# Patient Record
Sex: MALE | Race: WHITE | Employment: FULL TIME | URBAN - METROPOLITAN AREA
[De-identification: names, ages, dates, MRNs, and addresses within clinical notes are randomized per-mention and may not be internally consistent; named-entity substitution may affect disease eponyms.]

---

## 2019-03-26 ENCOUNTER — ANESTHESIA EVENT (OUTPATIENT)
Dept: ENDOSCOPY | Age: 49
End: 2019-03-26
Payer: COMMERCIAL

## 2019-03-26 RX ORDER — TELMISARTAN 40 MG/1
40 TABLET ORAL DAILY
COMMUNITY
End: 2019-09-23

## 2019-03-27 ENCOUNTER — ANESTHESIA (OUTPATIENT)
Dept: ENDOSCOPY | Age: 49
End: 2019-03-27
Payer: COMMERCIAL

## 2019-03-27 ENCOUNTER — HOSPITAL ENCOUNTER (OUTPATIENT)
Age: 49
Setting detail: OUTPATIENT SURGERY
Discharge: HOME | End: 2019-03-27
Attending: INTERNAL MEDICINE | Admitting: INTERNAL MEDICINE
Payer: COMMERCIAL

## 2019-03-27 VITALS
WEIGHT: 315 LBS | OXYGEN SATURATION: 94 % | DIASTOLIC BLOOD PRESSURE: 49 MMHG | RESPIRATION RATE: 18 BRPM | HEART RATE: 54 BPM | BODY MASS INDEX: 40.43 KG/M2 | SYSTOLIC BLOOD PRESSURE: 107 MMHG | TEMPERATURE: 97.4 F | HEIGHT: 74 IN

## 2019-03-27 PROCEDURE — 76060000031 HC ANESTHESIA FIRST 0.5 HR: Performed by: INTERNAL MEDICINE

## 2019-03-27 PROCEDURE — 74011250636 HC RX REV CODE- 250/636

## 2019-03-27 PROCEDURE — 74011250637 HC RX REV CODE- 250/637: Performed by: NURSE ANESTHETIST, CERTIFIED REGISTERED

## 2019-03-27 PROCEDURE — 77030018846 HC SOL IRR STRL H20 ICUM -A: Performed by: INTERNAL MEDICINE

## 2019-03-27 PROCEDURE — 74011250636 HC RX REV CODE- 250/636: Performed by: NURSE ANESTHETIST, CERTIFIED REGISTERED

## 2019-03-27 PROCEDURE — 76040000019: Performed by: INTERNAL MEDICINE

## 2019-03-27 PROCEDURE — 77030008565 HC TBNG SUC IRR ERBE -B: Performed by: INTERNAL MEDICINE

## 2019-03-27 RX ORDER — SODIUM CHLORIDE 0.9 % (FLUSH) 0.9 %
5-40 SYRINGE (ML) INJECTION EVERY 8 HOURS
Status: DISCONTINUED | OUTPATIENT
Start: 2019-03-27 | End: 2019-03-27 | Stop reason: HOSPADM

## 2019-03-27 RX ORDER — SODIUM CHLORIDE, SODIUM LACTATE, POTASSIUM CHLORIDE, CALCIUM CHLORIDE 600; 310; 30; 20 MG/100ML; MG/100ML; MG/100ML; MG/100ML
100 INJECTION, SOLUTION INTRAVENOUS CONTINUOUS
Status: CANCELLED | OUTPATIENT
Start: 2019-03-27

## 2019-03-27 RX ORDER — INSULIN LISPRO 100 [IU]/ML
INJECTION, SOLUTION INTRAVENOUS; SUBCUTANEOUS ONCE
Status: DISCONTINUED | OUTPATIENT
Start: 2019-03-27 | End: 2019-03-27 | Stop reason: HOSPADM

## 2019-03-27 RX ORDER — SODIUM CHLORIDE 0.9 % (FLUSH) 0.9 %
5-40 SYRINGE (ML) INJECTION AS NEEDED
Status: CANCELLED | OUTPATIENT
Start: 2019-03-27

## 2019-03-27 RX ORDER — LIDOCAINE HYDROCHLORIDE 20 MG/ML
INJECTION, SOLUTION EPIDURAL; INFILTRATION; INTRACAUDAL; PERINEURAL AS NEEDED
Status: DISCONTINUED | OUTPATIENT
Start: 2019-03-27 | End: 2019-03-27 | Stop reason: HOSPADM

## 2019-03-27 RX ORDER — SODIUM CHLORIDE, SODIUM LACTATE, POTASSIUM CHLORIDE, CALCIUM CHLORIDE 600; 310; 30; 20 MG/100ML; MG/100ML; MG/100ML; MG/100ML
75 INJECTION, SOLUTION INTRAVENOUS CONTINUOUS
Status: DISCONTINUED | OUTPATIENT
Start: 2019-03-27 | End: 2019-03-27 | Stop reason: HOSPADM

## 2019-03-27 RX ORDER — SODIUM CHLORIDE 0.9 % (FLUSH) 0.9 %
5-40 SYRINGE (ML) INJECTION EVERY 8 HOURS
Status: CANCELLED | OUTPATIENT
Start: 2019-03-27

## 2019-03-27 RX ORDER — LIDOCAINE HYDROCHLORIDE 10 MG/ML
0.1 INJECTION, SOLUTION EPIDURAL; INFILTRATION; INTRACAUDAL; PERINEURAL AS NEEDED
Status: DISCONTINUED | OUTPATIENT
Start: 2019-03-27 | End: 2019-03-27 | Stop reason: HOSPADM

## 2019-03-27 RX ORDER — FAMOTIDINE 20 MG/1
20 TABLET, FILM COATED ORAL ONCE
Status: COMPLETED | OUTPATIENT
Start: 2019-03-27 | End: 2019-03-27

## 2019-03-27 RX ORDER — PROPOFOL 10 MG/ML
INJECTION, EMULSION INTRAVENOUS AS NEEDED
Status: DISCONTINUED | OUTPATIENT
Start: 2019-03-27 | End: 2019-03-27 | Stop reason: HOSPADM

## 2019-03-27 RX ORDER — SODIUM CHLORIDE 0.9 % (FLUSH) 0.9 %
5-40 SYRINGE (ML) INJECTION AS NEEDED
Status: DISCONTINUED | OUTPATIENT
Start: 2019-03-27 | End: 2019-03-27 | Stop reason: HOSPADM

## 2019-03-27 RX ADMIN — SODIUM CHLORIDE, SODIUM LACTATE, POTASSIUM CHLORIDE, AND CALCIUM CHLORIDE: 600; 310; 30; 20 INJECTION, SOLUTION INTRAVENOUS at 07:53

## 2019-03-27 RX ADMIN — PROPOFOL 50 MG: 10 INJECTION, EMULSION INTRAVENOUS at 08:10

## 2019-03-27 RX ADMIN — PROPOFOL 100 MG: 10 INJECTION, EMULSION INTRAVENOUS at 08:00

## 2019-03-27 RX ADMIN — LIDOCAINE HYDROCHLORIDE 40 MG: 20 INJECTION, SOLUTION EPIDURAL; INFILTRATION; INTRACAUDAL; PERINEURAL at 08:00

## 2019-03-27 RX ADMIN — PROPOFOL 100 MG: 10 INJECTION, EMULSION INTRAVENOUS at 08:03

## 2019-03-27 RX ADMIN — FAMOTIDINE 20 MG: 20 TABLET ORAL at 07:09

## 2019-03-27 RX ADMIN — PROPOFOL 50 MG: 10 INJECTION, EMULSION INTRAVENOUS at 08:08

## 2019-03-27 RX ADMIN — PROPOFOL 50 MG: 10 INJECTION, EMULSION INTRAVENOUS at 08:12

## 2019-03-27 RX ADMIN — SODIUM CHLORIDE, SODIUM LACTATE, POTASSIUM CHLORIDE, AND CALCIUM CHLORIDE 75 ML/HR: 600; 310; 30; 20 INJECTION, SOLUTION INTRAVENOUS at 07:09

## 2019-03-27 RX ADMIN — PROPOFOL 50 MG: 10 INJECTION, EMULSION INTRAVENOUS at 08:06

## 2019-03-27 NOTE — DISCHARGE INSTRUCTIONS
Colonoscopy: What to Expect at [address] [place] [address]  After you have a colonoscopy, you will stay at the clinic for 1 to 2 hours until the medicines wear off. Then you can go home. But you will need to arrange for a ride. Your doctor will tell you when you can eat and do your other usual activities. Your doctor will talk to you about when you will need your next colonoscopy. Your doctor can help you decide how often you need to be checked. This will depend on the results of your test and your risk for colorectal cancer. After the test, you may be bloated or have gas pains. You may need to pass gas. If a biopsy was done or a polyp was removed, you may have streaks of blood in your stool (feces) for a few days. Problems such as heavy rectal bleeding may not occur until several weeks after the test. This isn't common. But it can happen after polyps are removed. This care sheet gives you a general idea about how long it will take for you to recover. But each person recovers at a different pace. Follow the steps below to get better as quickly as possible. How can you care for yourself at home? Activity    · Rest when you feel tired.     · You can do your normal activities when it feels okay to do so. Diet    · Follow your doctor's directions for eating.     · Unless your doctor has told you not to, drink plenty of fluids. This helps to replace the fluids that were lost during the colon prep.     · Do not drink alcohol. Medicines    · Your doctor will tell you if and when you can restart your medicines. He or she will also give you instructions about taking any new medicines.     · If you take blood thinners, such as warfarin (Coumadin), clopidogrel (Plavix), or aspirin, be sure to talk to your doctor. He or she will tell you if and when to start taking those medicines again.  Make sure that you understand exactly what your doctor wants you to do.     · If polyps were removed or a biopsy was done during the test, your doctor may tell you not to take aspirin or other anti-inflammatory medicines for a few days. These include ibuprofen (Advil, Motrin) and naproxen (Aleve). Other instructions    · For your safety, do not drive or operate machinery until the medicine wears off and you can think clearly. Your doctor may tell you not to drive or operate machinery until the day after your test.     · Do not sign legal documents or make major decisions until the medicine wears off and you can think clearly. The anesthesia can make it hard for you to fully understand what you are agreeing to. Follow-up care is a key part of your treatment and safety. Be sure to make and go to all appointments, and call your doctor if you are having problems. It's also a good idea to know your test results and keep a list of the medicines you take. When should you call for help? Call 911 anytime you think you may need emergency care. For example, call if:    · You passed out (lost consciousness).     · You pass maroon or bloody stools.     · You have trouble breathing.    Call your doctor now or seek immediate medical care if:    · You have pain that does not get better after you take pain medicine.     · You are sick to your stomach or cannot drink fluids.     · You have new or worse belly pain.     · You have blood in your stools.     · You have a fever.     · You cannot pass stools or gas.    Watch closely for changes in your health, and be sure to contact your doctor if you have any problems. Where can you learn more? Go to http://alison-abdirizak.info/. Enter E264 in the search box to learn more about \"Colonoscopy: What to Expect at Home. \"  Current as of: March 27, 2018  Content Version: 11.9  © 0211-3857 [employer]. Care instructions adapted under license by [employer] (which disclaims liability or warranty for this information).  If you have questions about a medical condition or this instruction, always ask your healthcare professional. [address] any warranty or liability for your use of this information. High-Fiber Diet: Care Instructions  Your Care Instructions    A high-fiber diet may help you relieve constipation and feel less bloated. Your doctor and dietitian will help you make a high-fiber eating plan based on your personal needs. The plan will include the things you like to eat. It will also make sure that you get 30 grams of fiber a day. Before you make changes to the way you eat, be sure to talk with your doctor or dietitian. Follow-up care is a key part of your treatment and safety. Be sure to make and go to all appointments, and call your doctor if you are having problems. It's also a good idea to know your test results and keep a list of the medicines you take. How can you care for yourself at home? · You can increase how much fiber you get if you eat more of certain foods. These foods include:  ? Whole-grain breads and cereals. ? Fruits, such as pears, apples, and peaches. Eat the skins, peels, and seeds, if you can.  ? Vegetables, such as broccoli, cabbage, spinach, carrots, asparagus, and squash. ? Starchy vegetables. These include potatoes with skins, kidney beans, and lima beans. · Take a fiber supplement every day if your doctor recommends it. Examples are Benefiber, Citrucel, FiberCon, and Metamucil. Ask your doctor how much to take. · Drink plenty of fluids, enough so that your urine is light yellow or clear like water. If you have kidney, heart, or liver disease and have to limit fluids, talk with your doctor before you increase the amount of fluids you drink. · Get some exercise every day. Exercise helps stool move through the colon. It also helps prevent constipation. · Keep a food diary. Try to notice and write down what foods cause gas, pain, or other symptoms. Then you can avoid these foods. Where can you learn more?   Go to http://alison-abdirizak.info/. Enter I050 in the search box to learn more about \"High-Fiber Diet: Care Instructions. \"  Current as of: March 28, 2018  Content Version: 11.9  © 2374-4529 [employer]. Care instructions adapted under license by [employer] (which disclaims liability or warranty for this information). If you have questions about a medical condition or this instruction, always ask your healthcare professional. [address] any warranty or liability for your use of this information. DISCHARGE SUMMARY from Nurse    PATIENT INSTRUCTIONS:    After general anesthesia or intravenous sedation, for 24 hours or while taking prescription Narcotics:  · Limit your activities  · Do not drive and operate hazardous machinery  · Do not make important personal or business decisions  · Do  not drink alcoholic beverages  · If you have not urinated within 8 hours after discharge, please contact your surgeon on call. Report the following to your surgeon:  · Excessive pain, swelling, redness or odor of or around the surgical area  · Temperature over 100.5  · Nausea and vomiting lasting longer than 4 hours or if unable to take medications  · Any signs of decreased circulation or nerve impairment to extremity: change in color, persistent  numbness, tingling, coldness or increase pain  · Any questions    *  Please give a list of your current medications to your Primary Care Provider. *  Please update this list whenever your medications are discontinued, doses are      changed, or new medications (including over-the-counter products) are added. *  Please carry medication information at all times in case of emergency situations.     These are general instructions for a healthy lifestyle:    No smoking/ No tobacco products/ Avoid exposure to second hand smoke  Surgeon General's Warning:  Quitting smoking now greatly reduces serious risk to your health. Obesity, smoking, and sedentary lifestyle greatly increases your risk for illness    A healthy diet, regular physical exercise & weight monitoring are important for maintaining a healthy lifestyle    You may be retaining fluid if you have a history of heart failure or if you experience any of the following symptoms:  Weight gain of 3 pounds or more overnight or 5 pounds in a week, increased swelling in our hands or feet or shortness of breath while lying flat in bed. Please call your doctor as soon as you notice any of these symptoms; do not wait until your next office visit. Recognize signs and symptoms of STROKE:    F-face looks uneven    A-arms unable to move or move unevenly    S-speech slurred or non-existent    T-time-call 911 as soon as signs and symptoms begin-DO NOT go       Back to bed or wait to see if you get better-TIME IS BRAIN. Warning Signs of HEART ATTACK     Call 911 if you have these symptoms:   Chest discomfort. Most heart attacks involve discomfort in the center of the chest that lasts more than a few minutes, or that goes away and comes back. It can feel like uncomfortable pressure, squeezing, fullness, or pain.  Discomfort in other areas of the upper body. Symptoms can include pain or discomfort in one or both arms, the back, neck, jaw, or stomach.  Shortness of breath with or without chest discomfort.  Other signs may include breaking out in a cold sweat, nausea, or lightheadedness. Don't wait more than five minutes to call 911 - MINUTES MATTER! Fast action can save your life. Calling 911 is almost always the fastest way to get lifesaving treatment. Emergency Medical Services staff can begin treatment when they arrive -- up to an hour sooner than if someone gets to the hospital by car. The discharge information has been reviewed with the patient and spouse. The patient and spouse verbalized understanding.   Discharge medications reviewed with the patient and spouse and appropriate educational materials and side effects teaching were provided.   ___________________________________________________________________________________________________________________________________

## 2019-03-27 NOTE — PERIOP NOTES
Patient confirmed by two identifiers with discharge instructions prior too being provided to patient and spouse.  Patient armband removed and shredded

## 2019-03-27 NOTE — ANESTHESIA POSTPROCEDURE EVALUATION
Procedure(s):  COLONOSCOPY. MAC    Anesthesia Post Evaluation      Multimodal analgesia: multimodal analgesia used between 6 hours prior to anesthesia start to PACU discharge  Patient location during evaluation: PACU  Patient participation: complete - patient participated  Level of consciousness: awake  Pain management: adequate  Airway patency: patent  Anesthetic complications: no  Cardiovascular status: acceptable  Respiratory status: acceptable  Hydration status: acceptable  Post anesthesia nausea and vomiting:  controlled      Vitals Value Taken Time   BP 98/41 3/27/2019  8:44 AM   Temp 36.8 °C (98.3 °F) 3/27/2019  8:26 AM   Pulse 58 3/27/2019  8:48 AM   Resp 20 3/27/2019  8:48 AM   SpO2 96 % 3/27/2019  8:48 AM   Vitals shown include unvalidated device data.

## 2019-03-27 NOTE — H&P
[facility]   [email or web address]      Impression:   1.crcs  obesity    Plan:     1. colo      Chief Complaint:   crcs    HPI:  Tressa Simon is a 57 y.o. male who is being seen on consult for the above. No Sxs, no fhx , first time screening.     PMH:   Past Medical History:   Diagnosis Date    Hypertension     ITP secondary to infection        PSH:   Past Surgical History:   Procedure Laterality Date    HX APPENDECTOMY      HX ORTHOPAEDIC Right 2013    repair of achilles heel       Social HX:   Social History     Socioeconomic History    Marital status:      Spouse name: Not on file    Number of children: Not on file    Years of education: Not on file    Highest education level: Not on file   Occupational History    Not on file   Social Needs    Financial resource strain: Not on file    Food insecurity:     Worry: Not on file     Inability: Not on file    Transportation needs:     Medical: Not on file     Non-medical: Not on file   Tobacco Use    Smoking status: Never Smoker    Smokeless tobacco: Never Used   Substance and Sexual Activity    Alcohol use: Yes     Comment: rarely    Drug use: Never    Sexual activity: Not on file   Lifestyle    Physical activity:     Days per week: Not on file     Minutes per session: Not on file    Stress: Not on file   Relationships    Social connections:     Talks on phone: Not on file     Gets together: Not on file     Attends Rastafari service: Not on file     Active member of club or organization: Not on file     Attends meetings of clubs or organizations: Not on file     Relationship status: Not on file    Intimate partner violence:     Fear of current or ex partner: Not on file     Emotionally abused: Not on file     Physically abused: Not on file     Forced sexual activity: Not on file   Other Topics Concern    Not on file   Social History Narrative    Not on file       FHX:   History reviewed. No pertinent family history. Allergy:   Allergies   Allergen Reactions    Percocet [Oxycodone-Acetaminophen] Other (comments)     hallucinations       Home Medications:     Medications Prior to Admission   Medication Sig    eltrombopag (PROMACTA) 50 mg tablet Take 50 mg by mouth Daily (before breakfast).  telmisartan (MICARDIS) 40 mg tablet Take 40 mg by mouth daily. Review of Systems:     Constitutional: No fevers, chills, weight loss, fatigue. Skin: No rashes, pruritis, jaundice, ulcerations, erythema. HENT: No headaches, nosebleeds, sinus pressure, rhinorrhea, sore throat. Eyes: No visual changes, blurred vision, eye pain, photophobia, jaundice. Cardiovascular: No chest pain, heart palpitations. Respiratory: No cough, SOB, wheezing, chest discomfort, orthopnea. Gastrointestinal: neg   Genitourinary: No dysuria, bleeding, discharge, pyuria. Musculoskeletal: No weakness, arthralgias, wasting. Endo: No sweats. Heme: No bruising, easy bleeding. Allergies: As noted. Neurological: Cranial nerves intact. Alert and oriented. Gait not assessed. Psychiatric:  No anxiety, depression, hallucinations. Visit Vitals  /73   Pulse 65   Temp 97.6 °F (36.4 °C)   Resp 20   Ht 6' 2\" (1.88 m)   Wt (!) 167.9 kg (370 lb 3.2 oz)   SpO2 95%   BMI 47.53 kg/m²       Physical Assessment:     constitutional: appearance: well developed, well nourished, normal habitus, no deformities, in no acute distress. skin: inspection: no rashes, ulcers, icterus or other lesions; no clubbing or telangiectasias. palpation: no induration or subcutaneos nodules. eyes: inspection: normal conjunctivae and lids; no jaundice pupils: symmetrical, normoreactive to light, normal accommodation and size. ENMT: mouth: normal oral mucosa,lips and gums; good dentition. oropharynx: normal tongue, hard and soft palate; posterior pharynx without erithema, exudate or lesions.    neck: thyroid: normal size, consistency and position; no masses or tenderness. respiratory: effort: normal chest excursion; no intercostal retraction or accessory muscle use. cardiovascular: abdominal aorta: normal size and position; no bruits. palpation: PMI of normal size and position; normal rhythm; no thrill or murmurs. abdominal: abdomen: normal consistency; no tenderness or masses. hernias: no hernias appreciated. liver: normal size and consistency. spleen: not palpable. rectal: hemoccult/guaiac: not performed. musculoskeletal: digits and nails: no clubbing, cyanosis, petechiae or other inflammatory conditions. gait: normal gait and station head and neck: normal range of motion; no pain, crepitation or contracture. spine/ribs/pelvis: normal range of motion; no pain, deformity or contracture. lymphatic: axilae: not palpable. groin: not palpable. neck: within normal limits. other: not palpable. neurologic: cranial nerves: II-XII normal.   psychiatric: judgement/insight: within normal limits. memory: within normal limits for recent and remote events. mood and affect: no evidence of depression, anxiety or agitation. orientation: oriented to time, space and person. Basic Metabolic Profile   No results for input(s): NA, K, CL, CO2, BUN, GLU, CA, MG, PHOS in the last 72 hours. No lab exists for component: CREAT      CBC w/Diff    No results for input(s): WBC, RBC, HGB, HCT, MCV, MCH, MCHC, RDW, PLT, HGBEXT, HCTEXT, PLTEXT in the last 72 hours. No lab exists for component: MPV No results for input(s): GRANS, LYMPH, EOS, PRO, MYELO, METAS, BLAST in the last 72 hours. No lab exists for component: MONO, BASO     Hepatic Function   No results for input(s): ALB, TP, TBILI, GPT, SGOT, AP, AML, LPSE in the last 72 hours. No lab exists for component: Abeba Grove MD, M.D. [facility] [address]  [email or web address]

## 2020-07-08 NOTE — PROGRESS NOTES
[facility]  [address], [place]   Phone: [phone]  Fax: [phone]        INITIAL CONSULTATION      HISTORY OF PRESENT ILLNESS:  Rosendo Mayo is a 52 y.o. male whom is referred from Ángel Magallanes NP secondary to right-sided low back pain without specific trauma. He rates his pain 2-6/10. He denies radicular symptoms. Additionally, he occasionally endorses right groin pain. He reports he was bent down cleaning chicken coops and felt an onset of pain when he stood up. Pt completed MDP followed by Ibuprofen with some benefit. Pt denies change in bowel or bladder habits. Pt denies fever, weight loss, or skin changes. Patient denies previous spinal surgery, injections, or physical therapy/chiropractic care. Pt is a nonsmoker. PmHx of obesity, HTN, previous MRSA infection, osteomyelitis of ankle and foot. Pt was previously followed by Dr. Ronquillo Speaker for back pain. L spine XR dated 6/22/2020 films independently reviewed. Per report, trace anterolisthesis, potentially isthimic in nature of L5 on S1. Mild degenerative changes as described. The patient is RHD.  reviewed. Body mass index is 48.69 kg/m². PCP: Ángel Magallanes NP    Past Medical History:   Diagnosis Date    Hypertension     Hypogonadism in male     ITP secondary to infection     Low testosterone     MRSA (methicillin resistant Staphylococcus aureus)           Past Surgical History:   Procedure Laterality Date    COLONOSCOPY N/A 3/27/2019    COLONOSCOPY performed by Yoselin Schwarz MD at [address] [place] [address] HX APPENDECTOMY      HX ORTHOPAEDIC Right 2013    repair of achilles heel         Social History     Tobacco Use    Smoking status: Never Smoker    Smokeless tobacco: Never Used   Substance Use Topics    Alcohol use: Yes     Comment: rarely       Work status: The patient is employed. Marital status: .      Current Outpatient Medications   Medication Sig Dispense Refill  ibuprofen (MOTRIN) 800 mg tablet Take  by mouth.  Needle, Disp, 25 G 25 gauge x 1\" ndle Use as directed 10 Each 1    Syringe, Disposable, 1 mL syrg Use as directed 30 Syringe 1    Needle, Disp, 18 G 18 gauge x 1\" ndle Use this needle to draw up 0.5ml Testosterone Cypionate 30 Pen Needle 1    Needle, Disp, 25 G 25 gauge x 5/8\" ndle Use this needle to inject 0.5ml SQ weekly 20 Each 0    testosterone cypionate (DEPOTESTOTERONE CYPIONATE) 200 mg/mL injection Inject 0.5ml SQ weekly 6 mL 1    testosterone (Fortesta) 10 mg/0.5 gram /actuation glpm Apply 3 pumps to the inside of each thigh 3 Bottle 2    irbesartan (AVAPRO) 150 mg tablet TAKE 1 TABLET BY MOUTH EVERY DAY  3    ergocalciferol (ERGOCALCIFEROL) 50,000 unit capsule TAKE ONE CAPSULE BY MOUTH ONCE A WEEK  6    eltrombopag (PROMACTA) 50 mg tablet Take 50 mg by mouth Daily (before breakfast). Allergies   Allergen Reactions    Percocet [Oxycodone-Acetaminophen] Other (comments)     hallucinations            Family History   Family history unknown: Yes         REVIEW OF SYSTEMS  Constitutional symptoms: Negative  Eyes: Negative  Ears, Nose, Throat, and Mouth: Negative  Cardiovascular: Negative  Respiratory: Negative  Genitourinary: Negative  Integumentary (Skin and/or breast): Negative  Musculoskeletal: Positive for right-sided low back pain. Extremities: Negative for edema. Endocrine/Rheumatologic: Negative  Hematologic/Lymphatic: Negative  Allergic/Immunologic: Negative  Psychiatric: Negative       PHYSICAL EXAMINATION  Visit Vitals  BP (!) 153/92 (BP 1 Location: Left arm, BP Patient Position: Sitting)   Pulse 72   Temp 97.1 °F (36.2 °C) (Temporal)   Ht 6' 2\" (1.88 m)   Wt (!) 379 lb 3.2 oz (172 kg)   SpO2 95%   BMI 48.69 kg/m²       CONSTITUTIONAL: NAD, A&O x 3  HEART: Regular rate and rhythm  GASTROINTESTINAL: Positive bowel sounds, soft, nontender, and nondistended  LUNGS: Clear to auscultation bilaterally.   SKIN: Negative for rash.  RANGE OF MOTION: The patient has full passive range of motion in all four extremities. SENSATION: Sensation is intact to light touch throughout. MOTOR:   Straight Leg Raise: Negative, bilateral  Allen: Negative, bilateral  Deep tendon reflexes are 0 at the biceps, triceps, and brachioradialis bilaterally. Deep tendon reflexes are 0 at the knees and ankles bilaterally. Shoulder AB/Flex Elbow Flex Wrist Ext Elbow Ext Wrist Flex Hand Intrin Tone   Right +4/5 +4/5 +4/5 +4/5 +4/5 +4/5 +4/5   Left +4/5 +4/5 +4/5 +4/5 +4/5 +4/5 +4/5              Hip Flex Knee Ext Knee Flex Ankle DF GTE Ankle PF Tone   Right +4/5 +4/5 +4/5 +4/5 +4/5 +4/5 +4/5   Left +4/5 +4/5 +4/5 +4/5 +4/5 +4/5 +4/5       ASSESSMENT   Diagnoses and all orders for this visit:    1. Lumbosacral spondylosis without myelopathy    2. Obesity, morbid ([address]    3. Spondylolisthesis, acquired      IMPRESSIONS/RECOMMENDATIONS:  Patient presents today with c/o right-sided low back pain. Multiple treatment options were discussed. I offered injections, pt declined. I will refer him to physical therapy with an emphasis on HEP. Patient is neurologically intact. I will see the patient back in 6 week's time or earlier if needed. Written by Albert Ramirez, as dictated by Fabienne García MD  I examined the patient, reviewed and agree with the note.

## 2020-07-13 ENCOUNTER — OFFICE VISIT (OUTPATIENT)
Dept: ORTHOPEDIC SURGERY | Age: 50
End: 2020-07-13

## 2020-07-13 VITALS
OXYGEN SATURATION: 95 % | BODY MASS INDEX: 40.43 KG/M2 | WEIGHT: 315 LBS | TEMPERATURE: 97.1 F | SYSTOLIC BLOOD PRESSURE: 153 MMHG | HEIGHT: 74 IN | DIASTOLIC BLOOD PRESSURE: 92 MMHG | HEART RATE: 72 BPM

## 2020-07-13 DIAGNOSIS — M47.817 LUMBOSACRAL SPONDYLOSIS WITHOUT MYELOPATHY: Primary | ICD-10-CM

## 2020-07-13 DIAGNOSIS — M43.10 SPONDYLOLISTHESIS, ACQUIRED: ICD-10-CM

## 2020-07-13 DIAGNOSIS — E66.01 OBESITY, MORBID (HCC): ICD-10-CM

## 2020-07-13 RX ORDER — IBUPROFEN 800 MG/1
TABLET ORAL
COMMUNITY
End: 2021-10-14

## 2020-07-13 NOTE — Clinical Note
7/13/20 Patient: Joseline Sharma YOB: 1970 Date of Visit: 7/13/2020 Provider MD Cristiano 
VIA Dear Provider Nati Dhillon MD, Thank you for referring Mr. Joseline Sharma to [address] for evaluation. My notes for this consultation are attached. If you have questions, please do not hesitate to call me. I look forward to following your patient along with you. Sincerely, Cash Hollis MD

## 2022-02-04 NOTE — ANESTHESIA PREPROCEDURE EVALUATION
Relevant Problems   No relevant active problems       Anesthetic History   No history of anesthetic complications            Review of Systems / Medical History  Patient summary reviewed and pertinent labs reviewed    Pulmonary                   Neuro/Psych   Within defined limits           Cardiovascular    Hypertension                   GI/Hepatic/Renal  Within defined limits              Endo/Other        Morbid obesity and anemia     Other Findings   Comments: Hx thrombocytopenia           Physical Exam    Airway  Mallampati: I  TM Distance: > 6 cm  Neck ROM: normal range of motion   Mouth opening: Normal     Cardiovascular    Rhythm: regular  Rate: normal         Dental  No notable dental hx       Pulmonary  Breath sounds clear to auscultation               Abdominal  GI exam deferred       Other Findings            Anesthetic Plan    ASA: 3  Anesthesia type: MAC            Anesthetic plan and risks discussed with: Patient and Parent / Dane Left
The procedure was performed independently

## 2022-03-18 ENCOUNTER — HOSPITAL ENCOUNTER (OUTPATIENT)
Dept: LAB | Age: 52
Discharge: HOME | End: 2022-03-18

## 2022-03-18 PROCEDURE — 99001 SPECIMEN HANDLING PT-LAB: CPT

## 2022-04-27 ENCOUNTER — HOSPITAL ENCOUNTER (OUTPATIENT)
Dept: LAB | Age: 52
Discharge: HOME | End: 2022-04-27

## 2022-04-27 PROCEDURE — 99001 SPECIMEN HANDLING PT-LAB: CPT

## 2022-06-06 ENCOUNTER — HOSPITAL ENCOUNTER (OUTPATIENT)
Dept: LAB | Age: 52
Discharge: HOME | End: 2022-06-06

## 2022-06-06 PROCEDURE — 99001 SPECIMEN HANDLING PT-LAB: CPT

## 2022-06-27 ENCOUNTER — HOSPITAL ENCOUNTER (OUTPATIENT)
Dept: LAB | Age: 52
Discharge: HOME | End: 2022-06-27

## 2022-06-27 PROCEDURE — 99001 SPECIMEN HANDLING PT-LAB: CPT

## 2022-07-26 ENCOUNTER — HOSPITAL ENCOUNTER (OUTPATIENT)
Dept: LAB | Age: 52
Discharge: HOME | End: 2022-07-26

## 2022-07-26 PROCEDURE — 99001 SPECIMEN HANDLING PT-LAB: CPT

## 2022-08-29 ENCOUNTER — TRANSCRIBE ORDER (OUTPATIENT)
Dept: REGISTRATION | Age: 52
End: 2022-08-29

## 2022-08-29 ENCOUNTER — HOSPITAL ENCOUNTER (OUTPATIENT)
Dept: LAB | Age: 52
Discharge: HOME | End: 2022-08-29

## 2022-08-29 DIAGNOSIS — D69.3 IMMUNE THROMBOCYTOPENIC PURPURA (HCC): Primary | ICD-10-CM

## 2022-08-29 PROCEDURE — 99001 SPECIMEN HANDLING PT-LAB: CPT

## 2022-09-08 ENCOUNTER — HOSPITAL ENCOUNTER (OUTPATIENT)
Dept: PHYSICAL THERAPY | Age: 52
Discharge: HOME | End: 2022-09-08
Payer: COMMERCIAL

## 2022-09-08 PROCEDURE — 97161 PT EVAL LOW COMPLEX 20 MIN: CPT

## 2022-09-08 PROCEDURE — 97535 SELF CARE MNGMENT TRAINING: CPT

## 2022-09-08 PROCEDURE — 97110 THERAPEUTIC EXERCISES: CPT

## 2022-09-08 NOTE — PROGRESS NOTES
[facility]  [address]  [place], [address]  [phone] fax    Plan of Care/ Statement of Necessity for Physical Therapy Services    Patient name: Desiree Koroma Start of Care: 2022   Referral source: Rick Rodriguez, DO : 1970    Medical Diagnosis: Pain in right arm [M79.601]  Payor: Adonay Godoy / Plan: Stephane López / Product Type: Workers Comp /  Onset Date:2022    Treatment Diagnosis: Right elbow pain   Prior Hospitalization: see medical history Provider#: [id number]   Medications: Verified on Patient summary List    Comorbidities: Achilles repair   Prior Level of Function: Pt RHD  unlimited with work duties     [employer] of Care and following information is based on the information from the initial evaluation. 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 He was found to have a distal biceps rupture but opted not for surgery due to extensive tendon transfers reported by patient. He reports modified duty currently with improving tolerance. Mainly reports weakness/fatigue with heavier activity and lateral elbow pain. Pt demonstrates symmetrical shoulder strength and elbow strength with MMT. No change in pain levels with examination today. He does demonstrates signs of lateral epicondylitis. Pt would benefit from PT to improve strength, endurance and pain to return to full work duty.      Evaluation Complexity History MEDIUM  Complexity : 1-2 comorbidities / personal factors will impact the outcome/ POC ; Examination HIGH Complexity : 4+ Standardized tests and measures addressing body structure, function, activity limitation and / or participation in recreation  ;Presentation LOW Complexity : Stable, uncomplicated  ;Clinical Decision Making MEDIUM Complexity : FOTO score of 26-74  Overall Complexity Rating: LOW   Problem List: pain affecting function, decrease strength, decrease ADL/ functional abilitiies, decrease activity tolerance, and decrease flexibility/ joint mobility   Treatment Plan may include any combination of the following: Therapeutic exercise, Therapeutic activities, Neuromuscular re-education, Physical agent/modality, Manual therapy, Patient education, Self Care training, and Functional mobility training  Patient / Family readiness to learn indicated by: asking questions, trying to perform skills, and interest  Persons(s) to be included in education: patient (P)  Barriers to Learning/Limitations: None  Patient Goal (s): Get back to work  Patient Self Reported Health Status: good  Rehabilitation Potential: good    Short Term Goals: To be accomplished in 3 weeks:  Pt will demonstrate I and compliance with HEP to maximize self management of symptoms. Pt will demonstrate right shoulder IR strength 5/5 without pain for improved ease of work duties. Pt will report 50% improvement in right lateral elbow symptoms with ADLs to return to full work duty. Pt will improve FOTO score to 74 to demonstrate improved quality of life and function. Frequency / Duration: Patient to be seen 2 times per week for 3 weeks. Patient/ Caregiver education and instruction: Diagnosis, prognosis, self care, activity modification, and exercises   [x]  Plan of care has been reviewed with PTA Corlis Hatchet DPT CMTPT 9/8/2022 12:35 PM    ________________________________________________________________________    I certify that the above Therapy Services are being furnished while the patient is under my care. I agree with the treatment plan and certify that this therapy is necessary.     [de-identified] Signature:____________Date:_________TIME:________     Shai Burroughs, DO  ** Signature, Date and Time must be completed for valid certification **    Please sign and return to [facility] [address] [place] [address] [address]  [phone] fax

## 2022-09-08 NOTE — PROGRESS NOTES
PT DAILY TREATMENT NOTE     Patient Name: David Mckeon  Date:2022  : 1970  [x]  Patient  Verified  Payor: Luis Londono / Plan: Yumi Martinez / Product Type: Workers Comp /    In [employer] time:12:32  Total Treatment Time (min): 36  Visit #: 1 of 6    Treatment Area: Pain in right arm [M79.601]    SUBJECTIVE  Pain Level (0-10 scale): 0-1  Any medication changes, allergies to medications, adverse drug reactions, diagnosis change, or new procedure performed?: [x] No    [] Yes (see summary sheet for update)  Subjective functional status/changes:   [] No changes reported  \"Its like phantom pain, it comes and goes, but feels tired when I have to use it a lot\"    OBJECTIVE    18 min [x]Eval                  []Re-Eval       8 min Therapeutic Exercise:  [] See flow sheet :   Rationale: increase ROM, increase strength, and increase proprioception to improve the patients ability to perform ADLs    10 min Therapeutic Activity:  []  See flow sheet :   Rationale:  pt education and instruction   to improve the patients ability to self manage symptoms and return to work             With   [] TE   [] TA   [] neuro   [] other: Patient Education: [x] Review HEP    [] Progressed/Changed HEP based on:   [] positioning   [] body mechanics   [] transfers   [] heat/ice application    [] other:      Other Objective/Functional Measures:      Pain Level (0-10 scale) post treatment: 0-1    ASSESSMENT/Changes in Function: see POC    Patient will continue to benefit from skilled PT services to modify and progress therapeutic interventions, address functional mobility deficits, address ROM deficits, address strength deficits, analyze and address soft tissue restrictions, analyze and cue movement patterns, and analyze and modify body mechanics/ergonomics to attain remaining goals.      [x]  See Plan of Care  []  See progress note/recertification  []  See Discharge Summary         Progress towards goals / Updated goals:  Short Term Goals: To be accomplished in 3 weeks:  Pt will demonstrate I and compliance with HEP to maximize self management of symptoms. IE: HEP issued and instructed  Pt will demonstrate right shoulder IR strength 5/5 without pain for improved ease of work duties. IE: 5/5 at 0 deg abd, mild pain  Pt will report 50% improvement in right lateral elbow symptoms with ADLs to return to full work duty. IE: 0%  Pt will improve FOTO score to 74 to demonstrate improved quality of life and function.   IE: 72    PLAN  []  Upgrade activities as tolerated     [x]  Continue plan of care  []  Update interventions per flow sheet       []  Discharge due to:_  []  Other:_      Debra Gasca DPT CMTPT 9/8/2022  2:32 PM    Future Appointments   Date Time Provider Kim Talamantes   9/14/2022  1:30 PM Ishmael Green, PTA [facility]   9/16/2022  1:30 PM Mejia Gonzales, PT [facility]   9/19/2022 12:00 PM Trellis Postin, PT [facility]   9/21/2022 12:00 PM Trellis Postin, PT [facility]   9/27/2022 12:00 PM Trellis Postin, PT [facility]   9/29/2022 12:15 PM Tyree Butt, PTA [facility]

## 2022-09-14 ENCOUNTER — HOSPITAL ENCOUNTER (OUTPATIENT)
Dept: PHYSICAL THERAPY | Age: 52
Discharge: HOME | End: 2022-09-14
Payer: COMMERCIAL

## 2022-09-14 PROCEDURE — 97530 THERAPEUTIC ACTIVITIES: CPT

## 2022-09-14 PROCEDURE — 97112 NEUROMUSCULAR REEDUCATION: CPT

## 2022-09-14 PROCEDURE — 97110 THERAPEUTIC EXERCISES: CPT

## 2022-09-14 NOTE — PROGRESS NOTES
PT DAILY TREATMENT NOTE     Patient Name: Shayan Pendleton  UCUA:  : 1970  [x]  Patient  Verified  Payor: Gurmeet Carballo / Plan: Neal Moreno / Product Type: Workers Comp /    In [employer] time:213  Total Treatment Time (min): 43  Visit #: 2 of 6      Treatment Area: Pain in right arm [M79.601]    SUBJECTIVE  Pain Level (0-10 scale): 0  Any medication changes, allergies to medications, adverse drug reactions, diagnosis change, or new procedure performed?: [x] No    [] Yes (see summary sheet for update)  Subjective functional status/changes:   [] No changes reported  Patient reports that he has noticed improvements in his elbow already but states that it \"gets tired quickly\". He has been consistent with his HEP. OBJECTIVE      23 min Therapeutic Exercise:  [x] See flow sheet :   Rationale: increase ROM and increase strength to improve the patients ability to complete AdLs with ease. 12 min Therapeutic Activity:  [x]  See flow sheet : functional elbow rotation and dribbling   Rationale: increase ROM, increase strength, improve coordination, and increase proprioception  to improve the patients ability to complete functional activities with ease. 8 min Neuromuscular Re-education:  [x]  See flow sheet : elbow and scapular re-education, including VC/Tc to decrease compensatory movement of B UT. D1 extension PNF facilitation. Rationale: increase ROM, increase strength, improve coordination, improve balance, and increase proprioception  to improve the patients ability to improve biomechanics, SHR, and increase kinesthetic awareness for ease of AdL completion.               With   [x] TE   [] TA   [] neuro   [] other: Patient Education: [x] Review HEP    [] Progressed/Changed HEP based on:   [] positioning   [] body mechanics   [] transfers   [] heat/ice application    [x] other: continue HEP     Other Objective/Functional Measures:  -VC/Tc to decrease B UT compensations during rows and extensions     Pain Level (0-10 scale) post treatment: 1    ASSESSMENT/Changes in Function: Patient tolerated initiation of POC well, reporting no adverse responses throughout activities and demonstrating a positive attitude toward therapy. He states that he is most challenged with concentric elbow flexion. Patient will continue to benefit from skilled PT services to modify and progress therapeutic interventions, address functional mobility deficits, address ROM deficits, address strength deficits, analyze and address soft tissue restrictions, analyze and cue movement patterns, analyze and modify body mechanics/ergonomics, assess and modify postural abnormalities, and instruct in home and community integration to attain remaining goals. [x]  See Plan of Care  []  See progress note/recertification  []  See Discharge Summary         Progress towards goals / Updated goals:  Short Term Goals: To be accomplished in 3 weeks:  Pt will demonstrate I and compliance with HEP to maximize self management of symptoms. IE: HEP issued and instructed  Current: met, patient reports daily compliance with HEP. 9/14/22  Pt will demonstrate right shoulder IR strength 5/5 without pain for improved ease of work duties. IE: 5/5 at 0 deg abd, mild pain  Pt will report 50% improvement in right lateral elbow symptoms with ADLs to return to full work duty. IE: 0%  Pt will improve FOTO score to 74 to demonstrate improved quality of life and function.   IE: 72    PLAN  [x]  Upgrade activities as tolerated     [x]  Continue plan of care  []  Update interventions per flow sheet       []  Discharge due to:_  []  Other:_      Claudette Ellis, PTA 9/14/2022  12:58 PM    Future Appointments   Date Time Provider Kim Talamantes   9/14/2022  1:30 PM Servando Shaver, PTA [facility]   9/16/2022  1:30 PM Gely Sena, PT [facility]   9/19/2022 12:00 PM [facility]   9/21/2022 12:00 PM Mart [facility] 9/27/2022 12:00 PM Ross Castillo, PT [facility]   9/29/2022 12:15 PM Justa Butt, PTA [facility]

## 2022-09-16 ENCOUNTER — HOSPITAL ENCOUNTER (OUTPATIENT)
Dept: PHYSICAL THERAPY | Age: 52
Discharge: HOME | End: 2022-09-16
Payer: COMMERCIAL

## 2022-09-16 PROCEDURE — 97110 THERAPEUTIC EXERCISES: CPT

## 2022-09-16 PROCEDURE — 97530 THERAPEUTIC ACTIVITIES: CPT

## 2022-09-16 PROCEDURE — 97112 NEUROMUSCULAR REEDUCATION: CPT

## 2022-09-16 NOTE — PROGRESS NOTES
PT DAILY TREATMENT NOTE     Patient Name: Micaela Bella  GMOC:[id number]  : 1970  [x]  Patient  Verified  Payor: Valeria Garcia / Plan: [place] [employer] / Product Type: Workers Comp /    In [employer] time:210pm  Total Treatment Time (min): 34  Visit #: 3 of 6    Treatment Area: Pain in right arm [M79.601]    SUBJECTIVE  Pain Level (0-10 scale): 0  Any medication changes, allergies to medications, adverse drug reactions, diagnosis change, or new procedure performed?: [x] No    [] Yes (see summary sheet for update)  Subjective functional status/changes:   [] No changes reported  Reports felt the discomfort the most with the pec doorway stretch last visit but otherwise doing okay. No pain at lateral elbow today, feels like the discomfort is deep (pointed to biceps insertion at cubital fossa)     OBJECTIVE  16 min Therapeutic Exercise:  [x] See flow sheet :   Rationale: increase ROM and increase strength to improve the patients ability to complete AdLs with ease. 8 min Therapeutic Activity:  [x]  See flow sheet : functional elbow rotation and dribbling   Rationale: increase ROM, increase strength, improve coordination, and increase proprioception  to improve the patients ability to complete functional activities with ease. 10 min Neuromuscular Re-education:  []  See flow sheet :   Rationale: increase strength and improve coordination  to improve the patients ability to manage gripping and lifting activities with improved stability. With   [] TE   [] TA   [] neuro   [] other: Patient Education: [x] Review HEP    [] Progressed/Changed HEP based on:   [] positioning   [] body mechanics   [] transfers   [] heat/ice application    [] other:      Other Objective/Functional Measures: Added QP ITY as well as theraband concentric biceps curl, brachioradialis curl, wrist flexion, and wrist extension sets.      Pain Level (0-10 scale) post treatment: 0    ASSESSMENT/Changes in Function: Pt performs all exercises as directed without pain, but does express some general fatigue. No lateral elbow pain with palpation of the epicondyle today so emphasized strengthening of biceps-supported muscular groups. Patient will continue to benefit from skilled PT services to modify and progress therapeutic interventions, address functional mobility deficits, address ROM deficits, address strength deficits, analyze and address soft tissue restrictions, analyze and cue movement patterns, analyze and modify body mechanics/ergonomics, and assess and modify postural abnormalities to attain remaining goals. []  See Plan of Care  []  See progress note/recertification  []  See Discharge Summary         Progress towards goals / Updated goals:  Short Term Goals: To be accomplished in 3 weeks:  Pt will demonstrate I and compliance with HEP to maximize self management of symptoms. IE: HEP issued and instructed  Current: met, patient reports daily compliance with HEP. 9/14/22  Pt will demonstrate right shoulder IR strength 5/5 without pain for improved ease of work duties. IE: 5/5 at 0 deg abd, mild pain  Pt will report 50% improvement in right lateral elbow symptoms with ADLs to return to full work duty. IE: 0%  Pt will improve FOTO score to 74 to demonstrate improved quality of life and function.   IE: 72    PLAN  []  Upgrade activities as tolerated     [x]  Continue plan of care  []  Update interventions per flow sheet       []  Discharge due to:_  []  Other:_      Tal Mirza, PT 9/16/2022  1:41 PM    Future Appointments   Date Time Provider Kim Talamantes   9/19/2022 12:00 PM Evens Beatty, PT [facility]   9/21/2022 12:00 PM Evens Beatty, PT [facility]   9/27/2022 12:00 PM Evens Beatty, PT [facility]   9/29/2022 12:15 PM Mike Butt, PTA [facility]

## 2022-09-19 ENCOUNTER — HOSPITAL ENCOUNTER (OUTPATIENT)
Dept: PHYSICAL THERAPY | Age: 52
Discharge: HOME | End: 2022-09-19
Payer: COMMERCIAL

## 2022-09-19 PROCEDURE — 97530 THERAPEUTIC ACTIVITIES: CPT

## 2022-09-19 PROCEDURE — 97110 THERAPEUTIC EXERCISES: CPT

## 2022-09-19 PROCEDURE — 97112 NEUROMUSCULAR REEDUCATION: CPT

## 2022-09-19 PROCEDURE — 97140 MANUAL THERAPY 1/> REGIONS: CPT

## 2022-09-19 NOTE — PROGRESS NOTES
PT DAILY TREATMENT NOTE 10-18    Patient Name: Luis Espinal  FFSE:  : 1970  [x]  Patient  Verified  Payor: Miryam Astorga / Plan: Janel Gregory / Product Type: Workers Comp /    In [employer]  Total Treatment Time (min): 36  Visit #: 4 of 6        Treatment Area: Pain in right arm [M79.601]    SUBJECTIVE  Pain Level (0-10 scale): 0  Any medication changes, allergies to medications, adverse drug reactions, diagnosis change, or new procedure performed?: [x] No    [] Yes (see summary sheet for update)  Subjective functional status/changes:   [x] No changes reported      OBJECTIVE    12 min Therapeutic Exercise:  [] See flow sheet :   Rationale: increase strength to improve the patients ability to perform daily activities      8 min Neuromuscular Re-education:  []  See flow sheet :   Rationale: increase strength and improve coordination  to improve the patients ability to manage gripping and lifting activities with improved stability    8 min Therapeutic Activity:  [x]  See flow sheet :   Rationale: increase strength  to improve the patients ability to perform work tasks  8 min Manual Therapy:  STM distal biceps   The manual therapy interventions were performed at a separate and distinct time from the therapeutic activities interventions. Rationale: decrease pain and increase tissue extensibility to relax mm for daily activities     With   [] TE   [] TA   [] neuro   [] other: Patient Education: [x] Review HEP    [] Progressed/Changed HEP based on:   [] positioning   [] body mechanics   [] transfers   [] heat/ice application    [] other:      Other Objective/Functional Measures:      Pain Level (0-10 scale) post treatment: 0    ASSESSMENT/Changes in Function: Added STM to distal biceps to improve soft tissue mobility. Minimal pain with exercises; able to tolerate increase in weight for most exercises.      Patient will continue to benefit from skilled PT services to modify and progress therapeutic interventions, address strength deficits, analyze and address soft tissue restrictions, and analyze and cue movement patterns to attain remaining goals. []  See Plan of Care  []  See progress note/recertification  []  See Discharge Summary         Progress towards goals / Updated goals:  Short Term Goals: To be accomplished in 3 weeks:  Pt will demonstrate I and compliance with HEP to maximize self management of symptoms. IE: HEP issued and instructed  Current: met, patient reports daily compliance with HEP. 9/14/22  Pt will demonstrate right shoulder IR strength 5/5 without pain for improved ease of work duties. IE: 5/5 at 0 deg abd, mild pain  Pt will report 50% improvement in right lateral elbow symptoms with ADLs to return to full work duty. IE: 0%  Pt will improve FOTO score to 74 to demonstrate improved quality of life and function.   IE: 72    PLAN  [x]  Upgrade activities as tolerated     []  Continue plan of care  []  Update interventions per flow sheet       []  Discharge due to:_  []  Other:_      Baldo Spurling, TAINA, CMTPT 9/19/2022  8:42 AM    Future Appointments   Date Time Provider Kim Talamantes   9/19/2022 12:00 PM James Renea, PT [facility]   9/21/2022 12:00 PM James Renea, PT [facility]   9/27/2022 12:00 PM Jamesheather Meier, PT [facility]   9/29/2022 12:15 PM Solomon Butt, PTA [facility]

## 2022-09-21 ENCOUNTER — HOSPITAL ENCOUNTER (OUTPATIENT)
Dept: PHYSICAL THERAPY | Age: 52
Discharge: HOME | End: 2022-09-21
Payer: COMMERCIAL

## 2022-09-21 PROCEDURE — 97140 MANUAL THERAPY 1/> REGIONS: CPT

## 2022-09-21 PROCEDURE — 97530 THERAPEUTIC ACTIVITIES: CPT

## 2022-09-21 PROCEDURE — 97112 NEUROMUSCULAR REEDUCATION: CPT

## 2022-09-21 PROCEDURE — 97110 THERAPEUTIC EXERCISES: CPT

## 2022-09-21 NOTE — PROGRESS NOTES
PT DAILY TREATMENT NOTE 10-18    Patient Name: Ger Schilling  Date:2022  : 1970  [x]  Patient  Verified  Payor: Kala Ellis / Plan: Maty Saleem / Product Type: Workers Comp /    In [employer]  Total Treatment Time (min): 32  Visit #: 5 of 6        Treatment Area: Pain in right arm [M79.601]    SUBJECTIVE  Pain Level (0-10 scale): 0  Any medication changes, allergies to medications, adverse drug reactions, diagnosis change, or new procedure performed?: [x] No    [] Yes (see summary sheet for update)  Subjective functional status/changes:   [] No changes reported  No pain, I think the massage loosened it up a little bit. OBJECTIVE    8 min Therapeutic Activity:  [x]  See flow sheet :   Rationale: increase strength  to improve the patients ability to perform ADLs    8 min Therapeutic Exercise:  [x] See flow sheet :   Rationale: increase strength to improve the patients ability to perform daily activities      8 min Neuromuscular Re-education:  [x]  See flow sheet :   Rationale: increase strength  to improve the patients ability to perform overhead activities     8 min Manual Therapy:  STM distal biceps   The manual therapy interventions were performed at a separate and distinct time from the therapeutic activities interventions. Rationale: increase tissue extensibility and decrease trigger points to relax mm for daily and work tasks  With   [] TE   [] TA   [] neuro   [] other: Patient Education: [x] Review HEP    [] Progressed/Changed HEP based on:   [] positioning   [] body mechanics   [] transfers   [] heat/ice application    [] other:      Other Objective/Functional Measures:      Pain Level (0-10 scale) post treatment: 0    ASSESSMENT/Changes in Function: No pain with exercises; able to tolerate increase in resistance with exercises per flow sheet. Plan to D/C next visit.      Patient will continue to benefit from skilled PT services to modify and progress therapeutic interventions, address strength deficits, analyze and cue movement patterns, and instruct in home and community integration to attain remaining goals. []  See Plan of Care  []  See progress note/recertification  []  See Discharge Summary         Progress towards goals / Updated goals:  Short Term Goals: To be accomplished in 3 weeks:  Pt will demonstrate I and compliance with HEP to maximize self management of symptoms. IE: HEP issued and instructed  Current: met, patient reports daily compliance with HEP. 9/14/22  Pt will demonstrate right shoulder IR strength 5/5 without pain for improved ease of work duties. IE: 5/5 at 0 deg abd, mild pain  Current:5/5 without pain goal met 9/21/22  Pt will report 50% improvement in right lateral elbow symptoms with ADLs to return to full work duty. IE: 0%  Pt will improve FOTO score to 74 to demonstrate improved quality of life and function.   IE: 72    PLAN  [x]  Upgrade activities as tolerated     []  Continue plan of care  []  Update interventions per flow sheet       []  Discharge due to:_  []  Other:_      Jung Marin, TAINA, CMTPT 9/21/2022  8:52 AM    Future Appointments   Date Time Provider Kim Talamantes   9/21/2022 12:00 PM [facility]   9/27/2022 12:00 PM [facility]   9/29/2022 12:15 PM Remi Butt, [facility]

## 2022-09-26 ENCOUNTER — HOSPITAL ENCOUNTER (OUTPATIENT)
Dept: LAB | Age: 52
Discharge: HOME | End: 2022-09-26

## 2022-09-26 PROCEDURE — 99001 SPECIMEN HANDLING PT-LAB: CPT

## 2022-09-27 ENCOUNTER — HOSPITAL ENCOUNTER (OUTPATIENT)
Dept: PHYSICAL THERAPY | Age: 52
Discharge: HOME | End: 2022-09-27
Payer: COMMERCIAL

## 2022-09-27 PROCEDURE — 97530 THERAPEUTIC ACTIVITIES: CPT

## 2022-09-27 PROCEDURE — 97535 SELF CARE MNGMENT TRAINING: CPT

## 2022-09-27 PROCEDURE — 97110 THERAPEUTIC EXERCISES: CPT

## 2022-09-27 PROCEDURE — 97112 NEUROMUSCULAR REEDUCATION: CPT

## 2022-09-27 NOTE — PROGRESS NOTES
PT DISCHARGE DAILY NOTE AND [id number]    Patient name: Luis A Almaguer Start of Care: 22   Referral source: Precilla Brunner, DO : 1970   Medical/Treatment Diagnosis: Pain in right arm [M79.601] Onset Date:22     Prior Hospitalization: see medical history Provider#: [id number]   Medications: Verified on Patient Summary List     Comorbidities: Achilles repair   Prior Level of Function: Pt RHD  unlimited with work duties  Visits from Start of Care: 6    Missed Visits: 0    Reporting Period : 22 to 22    Date:2022  : 1970  [x]  Patient  Verified  Payor: Gaston Esters / Plan: Merissa Gossget / Product Type: Workers Comp /    In [employer]  Total Treatment Time (min): 39  Visit #: 6 of 6    SUBJECTIVE  Pain Level (0-10 scale): 0  Any medication changes, allergies to medications, adverse drug reactions, diagnosis change, or new procedure performed?: [x] No    [] Yes (see summary sheet for update)  Subjective functional status/changes:   [] No changes reported  No pain right now, but I rode four wheelers over the weekend and my elbow was killing me.      OBJECTIVE    15 min Therapeutic Exercise:  [x] See flow sheet :   Rationale: increase strength to improve the patients ability to perform daily activities     8 min Therapeutic Activity:  [x]  See flow sheet :   Rationale: increase strength  to improve the patients ability to perform ADLs     8 min Neuromuscular Re-education:  [x]  See flow sheet :   Rationale: increase strength  to improve the patients ability to perform overhead lifting/reaching    8 min Self Care/Home Management: continue with HEP for maintenance; suggesting FCE before returning to full duty   Rationale: increase strength  to improve the patients ability to return to work full duty       With   [] TE   [] TA   [] neuro   [] other: Patient Education: [x] Review HEP    [] Progressed/Changed HEP based on:   [] positioning   [] body mechanics   [] transfers   [] heat/ice application    [] other:      Other Objective/Functional Measures: see below     Pain Level (0-10 scale) post treatment: 0    Summary of Care:  Short Term Goals: To be accomplished in 3 weeks:  Pt will demonstrate I and compliance with HEP to maximize self management of symptoms. IE: HEP issued and instructed  Current: met, patient reports daily compliance with HEP. Pt will demonstrate right shoulder IR strength 5/5 without pain for improved ease of work duties. IE: 5/5 at 0 deg abd, mild pain  Current:5/5 without pain goal met   Pt will report 50% improvement in right lateral elbow symptoms with ADLs to return to full work duty. IE: 0%  Current:100% lateral elbow pain; 0%medial/distal biceps pain   Pt will improve FOTO score to 74 to demonstrate improved quality of life and function. IE: 72  Current:67- 2 point gain  ASSESSMENT/Changes in Function: Patient has reached a plateau with skilled PT. FOTO improved only by 2 points. Patient would benefit from getting a FCE before he returns to full duty.      Thank you for this referral!      PLAN  [x]Discontinue therapy: [x]Patient has reached or is progressing toward set goals      []Patient is non-compliant or has abdicated      []Due to lack of appreciable progress towards set Ul. Immanuel Little, PT 9/27/2022  8:45 AM

## 2022-09-29 ENCOUNTER — APPOINTMENT (OUTPATIENT)
Dept: PHYSICAL THERAPY | Age: 52
End: 2022-09-29
Payer: COMMERCIAL

## (undated) DEVICE — GOWN ISOL IMPERV UNIV, DISP, OPEN BACK, BLUE --

## (undated) DEVICE — GAUZE SPONGES,16 PLY: Brand: CURITY

## (undated) DEVICE — MEDI-VAC NON-CONDUCTIVE SUCTION TUBING: Brand: CARDINAL HEALTH

## (undated) DEVICE — CANNULA ORIG TL CLR W FOAM CUSHIONS AND 14FT SUPL TB 3 CHN

## (undated) DEVICE — SYRINGE MED 20ML STD CLR PLAS LUERLOCK TIP N CTRL DISP

## (undated) DEVICE — SYRINGE MED 25GA 3ML L5/8IN SUBQ PLAS W/ DETACH NDL SFTY

## (undated) DEVICE — AIRLIFE™ NASAL OXYGEN CANNULA CURVED, NONFLARED TIP WITH 14 FOOT (4.3 M) CRUSH-RESISTANT TUBING, OVER-THE-EAR STYLE: Brand: AIRLIFE™

## (undated) DEVICE — SYR 50ML SLIP TIP NSAF LF STRL --

## (undated) DEVICE — FLUFF AND POLYMER UNDERPAD,EXTRA HEAVY: Brand: WINGS

## (undated) DEVICE — (D)GLOVE EXAM LG NITRL NS -- DISC BY MFR NO SUB

## (undated) DEVICE — FLEX ADVANTAGE 3000CC: Brand: FLEX ADVANTAGE

## (undated) DEVICE — SOLUTION IRRIG 1000ML H2O STRL BLT

## (undated) DEVICE — ENDOSCOPY PUMP TUBING/ CAP SET: Brand: ERBE

## (undated) DEVICE — MEDI-VAC SUCTION HIGH CAPACITY: Brand: CARDINAL HEALTH

## (undated) DEVICE — CATHETER SUCT TR FL TIP 14FR W/ O CTRL

## (undated) DEVICE — SYR 10ML LUER LOK 1/5ML GRAD --